# Patient Record
Sex: MALE | Race: ASIAN | NOT HISPANIC OR LATINO
[De-identification: names, ages, dates, MRNs, and addresses within clinical notes are randomized per-mention and may not be internally consistent; named-entity substitution may affect disease eponyms.]

---

## 2024-08-15 ENCOUNTER — NON-APPOINTMENT (OUTPATIENT)
Age: 24
End: 2024-08-15

## 2024-08-16 ENCOUNTER — APPOINTMENT (OUTPATIENT)
Dept: UROLOGY | Facility: CLINIC | Age: 24
End: 2024-08-16
Payer: COMMERCIAL

## 2024-08-16 VITALS
BODY MASS INDEX: 26.47 KG/M2 | HEIGHT: 70.47 IN | SYSTOLIC BLOOD PRESSURE: 138 MMHG | WEIGHT: 187 LBS | OXYGEN SATURATION: 97 % | RESPIRATION RATE: 16 BRPM | DIASTOLIC BLOOD PRESSURE: 95 MMHG | HEART RATE: 90 BPM | TEMPERATURE: 98 F

## 2024-08-16 DIAGNOSIS — N48.1 BALANITIS: ICD-10-CM

## 2024-08-16 DIAGNOSIS — N47.8 OTHER DISORDERS OF PREPUCE: ICD-10-CM

## 2024-08-16 PROBLEM — Z00.00 ENCOUNTER FOR PREVENTIVE HEALTH EXAMINATION: Status: ACTIVE | Noted: 2024-08-16

## 2024-08-16 PROCEDURE — G2211 COMPLEX E/M VISIT ADD ON: CPT | Mod: NC

## 2024-08-16 PROCEDURE — 99204 OFFICE O/P NEW MOD 45 MIN: CPT

## 2024-08-16 RX ORDER — CLOTRIMAZOLE AND BETAMETHASONE DIPROPIONATE 10; .5 MG/G; MG/G
1-0.05 CREAM TOPICAL TWICE DAILY
Qty: 1 | Refills: 3 | Status: ACTIVE | COMMUNITY
Start: 2024-08-16 | End: 1900-01-01

## 2024-08-16 NOTE — ADDENDUM
[FreeTextEntry1] : Entered by Michele Bello, acting as scribe for Dr. Robert Mehta. The documentation recorded by the scribe accurately reflects the service I personally performed and the decisions made by me.

## 2024-08-16 NOTE — LETTER BODY
[FreeTextEntry1] : Gage Coleman DO [address] [facility] [address], [place] [address] [phone]  Dear Dr. Coleman,   Reason for visit: Redundant prepuce. Balanitis  This is a 24 year -old male with redundant prepuce presenting with balanitis. Patient is here for evaluation for his condition. Patient has difficulty maintaining hygiene. He is able to retract his foreskin. He has balanitis. The patient denies any aggravating or relieving factors. He denies any hematuria or urinary incontinence. All other review of systems are negative. He has no cancer in his family medical history. He has no previous surgical history. Past medical history, family history and social history were inquired and were noncontributory to current condition. The patient does not use tobacco or drink alcohol. Medications and allergies were reviewed. He has no known allergies to medication.   On examination, the patient is a well-appearing male in no acute distress. He is alert and oriented follows commands. He has normal mood and affect. He is normocephalic. Neck is supple. Oral no thrush. Respirations are unlabored. His abdomen is soft and nontender. Bladder is nonpalpable. No CVA tenderness. Neurologically he is grossly intact. No peripheral edema. Skin without gross abnormality. He has normal male external genitalia. Normal meatus. Bilateral testes are descended intrascrotally and normal to palpation. On rectal examination, there is normal sphincter tone. The prostate is clinically benign without focal induration or nodularity. His foreskin is retractable.  Assessment: Redundant prepuce. Balanitis.  I counseled the patient. I discussed the options available to him. I recommended he start a trial of Clotrimazole-Betamethasone cream. I discussed the potential side effects of the medication. I counseled the patient on its use and side effects. If the patient develops any side effects, the patient will discontinue medication and contact me. he may also consider circumcision if his symptoms persist.  I counseled the patient regarding the procedure. The risks and benefits were discussed in detail as well as quality of life issues. Alternatives were given. I answered the patient questions. The patient wishes to consider the procedure and discuss this with their family.  The patient will follow-up as directed and will contact me with any questions or concerns or if the patient wishes to proceed with surgery.  Thank you for the opportunity to participate in the care of this patient. I'll keep you updated on his  progress.  Plan: Trial of Clotrimazole-Betamethasone cream. Follow up in 1 month.  Consider circumcision.

## 2024-09-20 ENCOUNTER — APPOINTMENT (OUTPATIENT)
Dept: UROLOGY | Facility: CLINIC | Age: 24
End: 2024-09-20
Payer: COMMERCIAL

## 2024-09-20 VITALS
DIASTOLIC BLOOD PRESSURE: 93 MMHG | OXYGEN SATURATION: 95 % | HEART RATE: 106 BPM | SYSTOLIC BLOOD PRESSURE: 145 MMHG | TEMPERATURE: 97.2 F | WEIGHT: 190 LBS | RESPIRATION RATE: 18 BRPM

## 2024-09-20 PROCEDURE — G2211 COMPLEX E/M VISIT ADD ON: CPT | Mod: NC

## 2024-09-20 PROCEDURE — 99214 OFFICE O/P EST MOD 30 MIN: CPT

## 2024-09-20 NOTE — LETTER BODY
[FreeTextEntry1] : Gage Coleman DO [address], [place] [address] [phone]  Dear Dr. Coleman,   Reason for visit: Redundant prepuce. Balanitis  This is a 24 year -old male with redundant prepuce presenting with balanitis. Patient is here for follow-up.  Patient reports taking Clotrimazole-Betamethasone cream regularly with no side effects or difficulties. He reports improved symptoms with medication. Patient has difficulty maintaining hygiene. He is able to retract his foreskin. He has balanitis. The patient denies any aggravating or relieving factors. He denies any hematuria or urinary incontinence. All other review of systems are negative. He has no cancer in his family medical history. He has no previous surgical history. Past medical history, family history and social history were inquired and were noncontributory to current condition. The patient does not use tobacco or drink alcohol. Medications and allergies were reviewed. He has no known allergies to medication.  On examination, the patient is a well-appearing male in no acute distress. He is alert and oriented follows commands. He has normal mood and affect. He is normocephalic. Neck is supple. Oral no thrush. Respirations are unlabored. His abdomen is soft and nontender. Bladder is nonpalpable. No CVA tenderness. Neurologically he is grossly intact. No peripheral edema. Skin without gross abnormality. He has normal male external genitalia. Normal meatus. Bilateral testes are descended intrascrotally and normal to palpation. On rectal examination, there is normal sphincter tone. The prostate is clinically benign without focal induration or nodularity. His foreskin is retractable.  Assessment: Redundant prepuce. Balanitis.  I counseled the patient. The patient reports improved symptoms on Clotrimazole-Betamethasone cream. I reassured the patient. He will follow-up in 1 year to esnure stability.  I renewed the patient's prescriptions today. I encouraged the patient to continue medications regularly as directed. Risks and alternatives were discussed. I answered the patient's questions.  If patient does develop recurrent balanitis, he may consider circumcision.  I counseled the patient regarding the procedure. The risks and benefits were discussed. Alternatives were given. I answered the patient questions. The patient will take the necessary preparations for the procedure. The patient will follow-up as directed and will contact me with any questions or concerns.   the patient will follow-up as directed and will contact me with any questions or concerns. Thank you for the opportunity to participate in the care of this patient. I'll keep you updated on his progress.  Plan: Continue Clotrimazole-Betamethasone cream. Follow up in 1 year. Consider circumcision.

## 2024-09-20 NOTE — LETTER BODY
[FreeTextEntry1] : Gage Coleman DO [address], [place] [address] [phone]  Dear Dr. Coleman,   Reason for visit: Redundant prepuce. Balanitis  This is a 24 year -old male with redundant prepuce presenting with balanitis. Patient is here for follow-up.  Patient reports taking Clotrimazole-Betamethasone cream regularly with no side effects or difficulties. He reports improved symptoms with medication. Patient has difficulty maintaining hygiene. He is able to retract his foreskin. He has balanitis. The patient denies any aggravating or relieving factors. He denies any hematuria or urinary incontinence. All other review of systems are negative. He has no cancer in his family medical history. He has no previous surgical history. Past medical history, family history and social history were inquired and were noncontributory to current condition. The patient does not use tobacco or drink alcohol. Medications and allergies were reviewed. He has no known allergies to medication.  On examination, the patient is a well-appearing male in no acute distress. He is alert and oriented follows commands. He has normal mood and affect. He is normocephalic. Neck is supple. Oral no thrush. Respirations are unlabored. His abdomen is soft and nontender. Bladder is nonpalpable. No CVA tenderness. Neurologically he is grossly intact. No peripheral edema. Skin without gross abnormality. He has normal male external genitalia. Normal meatus. Bilateral testes are descended intrascrotally and normal to palpation. On rectal examination, there is normal sphincter tone. The prostate is clinically benign without focal induration or nodularity. His foreskin is retractable.  Assessment: Redundant prepuce. Balanitis.  I counseled the patient. The patient reports improved symptoms on Clotrimazole-Betamethasone cream. I reassured the patient. He will follow-up in 1 year to esnure stability.  I renewed the patient's prescriptions today. I encouraged the patient to continue medications regularly as directed. Risks and alternatives were discussed. I answered the patient's questions.  If patient does develop recurrent balanitis, he may consider circumcision.  I counseled the patient regarding the procedure. The risks and benefits were discussed. Alternatives were given. I answered the patient questions. The patient will take the necessary preparations for the procedure. The patient will follow-up as directed and will contact me with any questions or concerns.   the patient will follow-up as directed and will contact me with any questions or concerns. Thank you for the opportunity to participate in the care of this patient. I'll keep you updated on his progress.  Plan: Continue Clotrimazole-Betamethasone cream. Follow up in 1 year. Consider circumcision.